# Patient Record
(demographics unavailable — no encounter records)

---

## 2024-11-08 NOTE — BEGINNING OF VISIT
[0] : 2) Feeling down, depressed, or hopeless: Not at all (0) [PHQ-2 Negative] : PHQ-2 Negative [[id number]] : 0 [Pain Scale: ___] : On a scale of 1-10, today the patient's pain is a(n) [unfilled]. [Never] : Never [Reviewed, no changes] : Reviewed, no changes [de-identified] : occ

## 2024-11-08 NOTE — BEGINNING OF VISIT
[0] : 2) Feeling down, depressed, or hopeless: Not at all (0) [PHQ-2 Negative] : PHQ-2 Negative [[id number]] : 0 [Pain Scale: ___] : On a scale of 1-10, today the patient's pain is a(n) [unfilled]. [Never] : Never [Reviewed, no changes] : Reviewed, no changes [de-identified] : occ

## 2024-11-08 NOTE — BEGINNING OF VISIT
[0] : 2) Feeling down, depressed, or hopeless: Not at all (0) [PHQ-2 Negative] : PHQ-2 Negative [[id number]] : 0 [Pain Scale: ___] : On a scale of 1-10, today the patient's pain is a(n) [unfilled]. [Never] : Never [Reviewed, no changes] : Reviewed, no changes [de-identified] : occ

## 2024-11-13 NOTE — HISTORY OF PRESENT ILLNESS
[de-identified] : 26 year old white male referred by Dr Kirk for newly diagnosed appendiceal carcinoid, Patient with no significant PMH developed acute onset of RLQ pain on 10/13/2024 , CT was consistent with acute non -ruptured  appendicitis , no adenopathy noted . ON 10/14/he underwent surgery for inflamed retrocecal appendix and was found with a small 5mm  tumor involving the tip of the appendix , composed of well differentiated neuroendocrine tumor , KI 67 1 - 2 % , margins were negative and no LVI identified , however perineural invasion was present . Tumor invades mesoappendix without involvement of visceral peritoneum .  The patient and family  have  consulted with  TYRON and Andrae and received conflicting opinions regarding the need for additional work up and right Hemicolectomy .

## 2024-11-13 NOTE — HISTORY OF PRESENT ILLNESS
[de-identified] : 26 year old white male referred by Dr Kirk for newly diagnosed appendiceal carcinoid, Patient with no significant PMH developed acute onset of RLQ pain on 10/13/2024 , CT was consistent with acute non -ruptured  appendicitis , no adenopathy noted . ON 10/14/he underwent surgery for inflamed retrocecal appendix and was found with a small 5mm  tumor involving the tip of the appendix , composed of well differentiated neuroendocrine tumor , KI 67 1 - 2 % , margins were negative and no LVI identified , however perineural invasion was present . Tumor invades mesoappendix without involvement of visceral peritoneum .  The patient and family  have  consulted with  TYRON and Andrae and received conflicting opinions regarding the need for additional work up and right Hemicolectomy .

## 2024-11-13 NOTE — HISTORY OF PRESENT ILLNESS
[de-identified] : 26 year old white male referred by Dr Kirk for newly diagnosed appendiceal carcinoid, Patient with no significant PMH developed acute onset of RLQ pain on 10/13/2024 , CT was consistent with acute non -ruptured  appendicitis , no adenopathy noted . ON 10/14/he underwent surgery for inflamed retrocecal appendix and was found with a small 5mm  tumor involving the tip of the appendix , composed of well differentiated neuroendocrine tumor , KI 67 1 - 2 % , margins were negative and no LVI identified , however perineural invasion was present . Tumor invades mesoappendix without involvement of visceral peritoneum .  The patient and family  have  consulted with  TYRON and Andrae and received conflicting opinions regarding the need for additional work up and right Hemicolectomy .

## 2024-11-13 NOTE — ASSESSMENT
[FreeTextEntry1] : 26 year old with incidentally diagnosed well differentiated  appendiceal carcinoid <1 cm s/p  with invasion of mesoappendix .  Perineural invasion  and no LV invasion . negative margins .  Stage T2 orT3 Nx depending on depth of invasion ( < 3mm or >3 mm ) of mesoappendix .  Information not available in the report ( will check with pathology   A long discussion ensued regarding treatment and prognosis of carcinoid tumors.  The prognosis of smaller tumors is excellent and Completion right hemicolectomy is not routinely  recommended  in small < 1cm regardless of the presence of adverse features  . The significance of perineural invasion is controversial and does not appear to increase recurrence rate . We discussed  the role of imaging with DOTATEATE PET scan  , we will order the test although less likely  to detect residual or metastatic microscopic  disease .

## 2024-11-13 NOTE — REASON FOR VISIT
[Initial Consultation] : an initial consultation [Parent] : parent [Family Member] : family member [FreeTextEntry2] : Appendiceal carcinoid  5

## 2024-11-13 NOTE — REASON FOR VISIT
[Initial Consultation] : an initial consultation [Parent] : parent [Family Member] : family member [FreeTextEntry2] : Appendiceal carcinoid